# Patient Record
(demographics unavailable — no encounter records)

---

## 2018-03-23 NOTE — ER
Nurse's Notes                                                                                     

 [facility]                                                                

Name: Alexa Parish                                                                              

Age: 27 yrs                                                                                       

Sex: Female                                                                                       

: 1991                                                                                   

MRN: [id number]                                                                                   

Arrival Date: 2018                                                                          

Time: 14:44                                                                                       

Account#: [id number]                                                                            

Bed 23                                                                                            

Private MD:                                                                                       

Diagnosis: Nausea with vomiting, unspecified;Weakness                                             

                                                                                                  

Presentation:                                                                                     

                                                                                             

14:55 Presenting complaint: Patient states: headache, nausea, and fatigue for one week.       la1 

      Transition of care: patient was not received from another setting of care. Onset of         

      symptoms was 2018. Care prior to arrival: None.                                   

14:55 Method Of Arrival: Ambulatory                                                           la1 

14:55 Acuity: PRANEETH 3                                                                           la1 

                                                                                                  

OB/GYN:                                                                                           

15:17 LMP 3/23/2018                                                                           tw2 

                                                                                                  

Historical:                                                                                       

- Allergies:                                                                                      

14:55 Sulfa (Sulfonamide Antibiotics);                                                        la1 

- PMHx:                                                                                           

14:55 None;                                                                                   la1 

                                                                                                  

- Immunization history:: Adult Immunizations up to date.                                          

- Social history:: Smoking status: Patient/guardian denies using tobacco.                         

                                                                                                  

                                                                                                  

Screenin:58 Abuse screen: Denies threats or abuse. Nutritional screening: No deficits noted.        tw2 

      Tuberculosis screening: No symptoms or risk factors identified. Fall Risk None              

      identified.                                                                                 

                                                                                                  

Assessment:                                                                                       

15:17 General: Appears in no apparent distress. obese, well groomed, Behavior is calm,        tw2 

      cooperative, appropriate for age. Pain: Complains of pain in abdomen. Neuro: Level of       

      Consciousness is awake, alert, obeys commands, Oriented to person, place, time,             

      situation. Cardiovascular: Denies chest pain, shortness of breath, Heart tones S1 S2        

      Capillary refill < 3 seconds Patient's skin is warm and dry. Respiratory: Airway is         

      patent Respiratory effort is even, unlabored, Respiratory pattern is regular,               

      symmetrical, Breath sounds are clear bilaterally. GI: Abdomen is round obese, Bowel         

      sounds Reports diarrhea, nausea. : No signs and/or symptoms were reported regarding       

      the genitourinary system. EENT: No signs and/or symptoms were reported regarding the        

      EENT system. Derm: No signs and/or symptoms reported regarding the dermatologic system.     

      Skin is intact, is healthy with good turgor, Skin temperature is warm. Musculoskeletal:     

      Range of motion:.                                                                           

15:44 Reassessment: Patient appears in no apparent distress at this time. No changes from     tw2 

      previously documented assessment. Patient and/or family updated on plan of care and         

      expected duration. Pain level reassessed. Patient is alert, oriented x 3, equal             

      unlabored respirations, skin warm/dry/pink.                                                 

16:22 Reassessment: Patient appears in no apparent distress at this time. No changes from     tw2 

      previously documented assessment. Patient and/or family updated on plan of care and         

      expected duration. Pain level reassessed. Patient is alert, oriented x 3, equal             

      unlabored respirations, skin warm/dry/pink. Patient states feeling better.                  

16:43 Reassessment: Patient appears in no apparent distress at this time. No changes from     tw2 

      previously documented assessment. Patient and/or family updated on plan of care and         

      expected duration. Pain level reassessed. Patient is alert, oriented x 3, equal             

      unlabored respirations, skin warm/dry/pink.                                                 

                                                                                                  

Vital Signs:                                                                                      

14:55  / 92; Pulse 95; Resp 16; Temp 98.5(TE); Pulse Ox 99% on R/A; Weight 136.08 kg;   la1 

      Height 5 ft. 7 in. (170.18 cm);                                                             

15:45  / 73; Pulse 79; Resp 18; Pulse Ox 96% on R/A;                                    tw2 

16:22  / 81; Pulse 78; Resp 17; Pulse Ox 100% on R/A;                                   tw2 

14:55 Body Mass Index 46.99 (136.08 kg, 170.18 cm)                                            la1 

                                                                                                  

ED Course:                                                                                        

14:44 Patient arrived in ED.                                                                  as  

14:52 [facility]                                                        kb  

14:52 Rei Zuluaga MD is Attending Physician.                                                kb  

14:55 Triage completed.                                                                       la1 

14:56 Arm band placed on left wrist.                                                          la1 

14:57 Nicole Atkinson, RN is Primary Nurse.                                                        tw2 

14:58 Bed in low position. Call light in reach. Pulse ox on. NIBP on.                         tw2 

14:58 No provider procedures requiring assistance completed.                                  tw2 

15:10 Inserted saline lock: 22 gauge in right antecubital area, using aseptic technique.      tw2 

      Blood collected.                                                                            

15:36 Flu Sent.                                                                               tw2 

16:19 Awaiting: awaiting completion of iv fluids prior to discharge.                          tw2 

16:44 IV discontinued, intact, bleeding controlled, No redness/swelling at site. Pressure     tw2 

      dressing applied.                                                                           

                                                                                                  

Administered Medications:                                                                         

15:16 Drug: NS 0.9% 1000 ml Route: IV; Rate: 1000 ml; Site: right antecubital;                tw2 

16:44 Follow up: Response: No adverse reaction; IV Status: Completed infusion; IV Intake:     tw2 

      1000ml                                                                                      

15:16 Drug: Zofran 4 mg Route: IVP; Site: right antecubital;                                  tw2 

15:45 Follow up: Response: No adverse reaction; Nausea is decreased                           tw2 

                                                                                                  

                                                                                                  

Intake:                                                                                           

16:44 IV: 1000ml; Total: 1000ml.                                                              tw2 

                                                                                                  

Outcome:                                                                                          

16:12 Discharge ordered by MD.                                                                ron  

16:44 Discharged to home ambulatory, with significant other.                                  tw2 

16:44 Condition: stable                                                                           

16:44 Discharge instructions given to patient, significant other, Instructed on discharge         

      instructions, follow up and referral plans. medication usage, Demonstrated                  

      understanding of instructions, follow-up care, medications, Prescriptions given X 1.        

16:45 Patient left the ED.                                                                    tw2 

                                                                                                  

Signatures:                                                                                       

Trinidad Alberto, MALCOLMP-C                 DIMITRI-Aditi Price Lee, RN                         RN   la1                                                  

Nicole Atkinson RN                          RN   tw2                                                  

                                                                                                  

**************************************************************************************************

## 2018-03-23 NOTE — EDPHYS
Physician Documentation                                                                           

 [facility]                                                                

Name: Alexa Parish                                                                              

Age: 27 yrs                                                                                       

Sex: Female                                                                                       

: 1991                                                                                   

MRN: [id number]                                                                                   

Arrival Date: 2018                                                                          

Time: 14:44                                                                                       

Account#: [id number]                                                                            

Bed 23                                                                                            

Private MD:                                                                                       

ED Physician Rei Zuluaga                                                                         

HPI:                                                                                              

                                                                                             

15:21 This 27 yrs old  Female presents to ER via Ambulatory with complaints of       kb  

      Nausea, Diarrhea, Weakness.                                                                 

15:21 The patient presents to the emergency department with nausea, that is moderate. Onset:  kb  

      The symptoms/episode began/occurred last week. Possible causes: unknown. The symptoms       

      are aggravated by nothing. The symptoms are alleviated by nothing. Associated signs and     

      symptoms: Pertinent positives: nausea. Severity of symptoms: At their worst the             

      symptoms were moderate in the emergency department the symptoms are unchanged. The          

      patient has not experienced similar symptoms in the past. The patient has not recently      

      seen a physician. Pt c/o nausea, weakness and fatigue for a week. States she was around     

      someone with the flu so she came to get tested.                                             

                                                                                                  

OB/GYN:                                                                                           

15:17 LMP 3/23/2018                                                                           tw2 

                                                                                                  

Historical:                                                                                       

- Allergies:                                                                                      

14:55 Sulfa (Sulfonamide Antibiotics);                                                        la1 

- PMHx:                                                                                           

14:55 None;                                                                                   la1 

                                                                                                  

- Immunization history:: Adult Immunizations up to date.                                          

- Social history:: Smoking status: Patient/guardian denies using tobacco.                         

                                                                                                  

                                                                                                  

ROS:                                                                                              

15:21 Constitutional: Negative for fever, chills, and weight loss, ENT: Negative for injury,  kb  

      pain, and discharge, Neck: Negative for injury, pain, and swelling, Cardiovascular:         

      Negative for chest pain, palpitations, and edema, Respiratory: Negative for shortness       

      of breath, cough, wheezing, and pleuritic chest pain, Back: Negative for injury and         

      pain, : Negative for injury, bleeding, discharge, and swelling, MS/Extremity:             

      Negative for injury and deformity, Skin: Negative for injury, rash, and discoloration.      

15:21 Abdomen/GI: Positive for nausea, Negative for abdominal pain, diarrhea, constipation,       

      abdominal cramps, abdominal distension, anorexia.                                           

15:21 Neuro: Positive for weakness.                                                               

                                                                                                  

Exam:                                                                                             

15:21 Constitutional:  This is a well developed, well nourished patient who is awake, alert,  kb  

      and in no acute distress. Head/Face:  Normocephalic, atraumatic. Chest/axilla:  Normal      

      chest wall appearance and motion.  Nontender with no deformity.  No lesions are             

      appreciated. Cardiovascular:  Regular rate and rhythm with a normal S1 and S2.  No          

      gallops, murmurs, or rubs.  Normal PMI, no JVD.  No pulse deficits. Respiratory:  Lungs     

      have equal breath sounds bilaterally, clear to auscultation and percussion.  No rales,      

      rhonchi or wheezes noted.  No increased work of breathing, no retractions or nasal          

      flaring. Abdomen/GI:  Soft, non-tender, with normal bowel sounds.  No distension or         

      tympany.  No guarding or rebound.  No evidence of tenderness throughout. Back:  No          

      spinal tenderness.  No costovertebral tenderness.  Full range of motion. Skin:  Warm,       

      dry with normal turgor.  Normal color with no rashes, no lesions, and no evidence of        

      cellulitis. MS/ Extremity:  Pulses equal, no cyanosis.  Neurovascular intact.  Full,        

      normal range of motion. Neuro:  Awake and alert, GCS 15, oriented to person, place,         

      time, and situation.  Cranial nerves II-XII grossly intact.  Motor strength 5/5 in all      

      extremities.  Sensory grossly intact.  Cerebellar exam normal.  Normal gait.                

                                                                                                  

Vital Signs:                                                                                      

14:55  / 92; Pulse 95; Resp 16; Temp 98.5(TE); Pulse Ox 99% on R/A; Weight 136.08 kg;   la1 

      Height 5 ft. 7 in. (170.18 cm);                                                             

15:45  / 73; Pulse 79; Resp 18; Pulse Ox 96% on R/A;                                    tw2 

16:22  / 81; Pulse 78; Resp 17; Pulse Ox 100% on R/A;                                   tw2 

14:55 Body Mass Index 46.99 (136.08 kg, 170.18 cm)                                            la1 

                                                                                                  

MDM:                                                                                              

14:59 Patient medically screened.                                                             kb  

15:20 Data reviewed: vital signs, nurses notes. Data interpreted: Pulse oximetry: on room air kb  

      is 99 %. Interpretation: normal.                                                            

16:09 Counseling: I had a detailed discussion with the patient and/or guardian regarding: the kb  

      historical points, exam findings, and any diagnostic results supporting the                 

      discharge/admit diagnosis, lab results, the need for outpatient follow up, a family         

      practitioner, to return to the emergency department if symptoms worsen or persist or if     

      there are any questions or concerns that arise at home.                                     

                                                                                                  

03/23                                                                                             

15:04 Order name: CBC with Diff                                                               kb  

                                                                                             

15:04 Order name: Basic Metabolic Panel                                                       kb  

                                                                                             

15:04 Order name: Mono Screen Profile                                                         kb  

                                                                                             

15:25 Order name: Flu                                                                         kb  

                                                                                             

15:29 Order name: CBC with Automated Diff; Complete Time: 15:30                               EDMS

                                                                                             

15:30 Order name: Basic Metabolic Panel; Complete Time: 15:30                                 EDMS

                                                                                             

15:04 Order name: IV Start; Complete Time: 15:10                                              kb  

                                                                                             

15:46 Order name: Mono Screen; Complete Time: 15:47                                           EDMS

                                                                                             

16:06 Order name: Influenza Screen (A ; Complete Time: 16:08                                  EDMS

                                                                                                  

Administered Medications:                                                                         

15:16 Drug: NS 0.9% 1000 ml Route: IV; Rate: 1000 ml; Site: right antecubital;                tw2 

16:44 Follow up: Response: No adverse reaction; IV Status: Completed infusion; IV Intake:     tw2 

      1000ml                                                                                      

15:16 Drug: Zofran 4 mg Route: IVP; Site: right antecubital;                                  tw2 

15:45 Follow up: Response: No adverse reaction; Nausea is decreased                           tw2 

                                                                                                  

                                                                                                  

Disposition:                                                                                      

17:54 Co-signature as Attending Physician, Rei Zuluaga MD.                                    rn  

                                                                                                  

Disposition:                                                                                      

18 16:12 Discharged to Home. Impression: Nausea with vomiting, unspecified, Weakness.       

- Condition is Stable.                                                                            

- Discharge Instructions: Nausea and Vomiting, Easy-to-Read, Weakness, Easy-to-Read.              

- Prescriptions for Zofran 4 mg Oral Tablet - take 1 tablet by ORAL route every 6 hours           

  As needed; 20 tablet.                                                                           

- Medication Reconciliation Form, Thank You Letter, Antibiotic Education, Prescription            

  Opioid Use, Work release form form.                                                             

- Follow up: Emergency Department; When: As needed; Reason: Worsening of condition.               

  Follow up: Private Physician; When: 2 - 3 days; Reason: Recheck today's complaints,             

  Continuance of care, Re-evaluation by your physician.                                           

                                                                                                  

                                                                                                  

                                                                                                  

Signatures:                                                                                       

Dispatcher [employer]                           EDTrinidad Alcazar, MALCOLMP-C                 MALCOLMP-Rei Hill MD MD rn Attema, Lee, RN RN   la1                                                  

Nicole Atkinson RN RN   tw2                                                  

                                                                                                  

**************************************************************************************************

## 2018-06-02 NOTE — ER
Nurse's Notes                                                                                     

 [facility]                                                                

Name: Alexa Parish                                                                              

Age: 27 yrs                                                                                       

Sex: Female                                                                                       

: 1991                                                                                   

MRN: [id number]                                                                                   

Arrival Date: 2018                                                                          

Time: 13:51                                                                                       

Account#: [id number]                                                                            

Bed 23                                                                                            

Private MD: Out, [facility]                                                                    

Diagnosis: Low back pain                                                                          

                                                                                                  

Presentation:                                                                                     

                                                                                             

13:52 Presenting complaint: Patient states: "I went to [facility] ([facility] on Wednesday and they    sv  

      said I have crystals in my urine but they didn't do any imaging and sent me home with       

      nausea medicine and ibuprofen but it hasn't been doing anything for me." c/o n/v.           

      Transition of care: patient was not received from another setting of care. Onset of         

      symptoms was May 30, 2018. Risk Assessment: Do you want to hurt yourself or someone         

      else? Patient reports no desire to harm self or others. Initial Sepsis Screen: Does the     

      patient meet any 2 criteria? No. Patient's initial sepsis screen is negative. Does the      

      patient have a suspected source of infection? No. Patient's initial sepsis screen is        

      negative. Care prior to arrival: None.                                                      

13:52 Method Of Arrival: Ambulatory                                                           sv  

13:52 Acuity: PRANEETH 3                                                                           sv  

                                                                                                  

OB/GYN:                                                                                           

13:55 LMP 2018                                                                           sv  

                                                                                                  

Historical:                                                                                       

- Allergies:                                                                                      

13:54 Sulfa (Sulfonamide Antibiotics);                                                        sv  

- Home Meds:                                                                                      

13:54 Trileptal oral oral [Active]; Abilify oral oral [Active]; Buspirone Oral [Active];      sv  

      Zoloft Oral [Active];                                                                       

- PMHx:                                                                                           

13:54 Bipolar disorder; Anxiety;                                                              sv  

- PSHx:                                                                                           

13:54 None;                                                                                   sv  

                                                                                                  

- Immunization history:: Adult Immunizations up to date.                                          

- Social history:: Smoking status: Patient/guardian denies using tobacco.                         

- Ebola Screening: : No symptoms or risks identified at this time.                                

                                                                                                  

                                                                                                  

Screenin:19 Abuse screen: Denies threats or abuse. Nutritional screening: No deficits noted.        tl3 

      Tuberculosis screening: No symptoms or risk factors identified. Fall Risk None              

      identified.                                                                                 

                                                                                                  

Assessment:                                                                                       

14:19 General: Appears in no apparent distress. comfortable, obese, well groomed, well        tl3 

      developed, well nourished. Pain: Complains of pain in right upper quadrant, right           

      flank. Neuro: No deficits noted. Level of Consciousness is awake, alert, obeys              

      commands, Oriented to person, place, time, situation, Appropriate for age.                  

      Cardiovascular: No deficits noted. Heart tones S1 S2 present Patient's skin is warm and     

      dry. Respiratory: Airway is patent Respiratory effort is even, unlabored, Respiratory       

      pattern is regular, symmetrical, Breath sounds are clear bilaterally. GI: Bowel sounds      

      present X 4 quads. Abd is soft and non tender. : Urine is clear, Reports pain dx with     

      kidney stones on Thursday. EENT: No deficits noted. No signs and/or symptoms were           

      reported regarding the EENT system. Derm: No deficits noted. No signs and/or symptoms       

      reported regarding the dermatologic system. Musculoskeletal: No deficits noted.             

15:16 Reassessment: Patient appears in no apparent distress at this time. No changes from     tl3 

      previously documented assessment. Patient and/or family updated on plan of care and         

      expected duration. Pain level reassessed. Patient is alert, oriented x 3, equal             

      unlabored respirations, skin warm/dry/pink.                                                 

                                                                                                  

Vital Signs:                                                                                      

13:55  / 87; Pulse 92; Resp 18; Temp 98.4; Pulse Ox 99% ; Weight 131.54 kg; Height 5    sv  

      ft. 8 in. (172.72 cm); Pain 6/10;                                                           

15:16  / 71; Pulse 81; Resp 18; Pulse Ox 100% ;                                         tl3 

13:55 Body Mass Index 44.09 (131.54 kg, 172.72 cm)                                            sv  

                                                                                                  

ED Course:                                                                                        

13:51 Patient arrived in ED.                                                                  sb2 

13:52 Out, [facility] is Private Physician.                                                      sb2 

13:54 Triage completed.                                                                       sv  

13:57 Joseph Jackson PA is PHCP.                                                              [facility] 

13:57 Gregg Lara MD is Attending Physician.                                              jmm 

13:58 Arm band placed on right wrist. Patient placed in an exam room, on a stretcher.         sv  

14:19 Marcela Davis, RN is Primary Nurse.                                                     tl3 

14:19 Patient has correct armband on for positive identification. Bed in low position. Call   tl3 

      light in reach. Side rails up X 1. Pulse ox on. NIBP on.                                    

14:19 No provider procedures requiring assistance completed. Initial lab(s) drawn, by me,     tl3 

      sent to lab. Urine collected: clean catch specimen, clear. Inserted saline lock: 20         

      gauge in left forearm, using aseptic technique. Blood collected.                            

15:23 CT Stone Protocol In Process Unspecified.                                               EDMS

15:23 CT completed. Patient moved to CT via wheelchair. Patient taken to ultrasound.          cw1 

15:29 Ultrasound completed. Patient tolerated well.                                           sg3 

15:43 US Abdomen Limited In Process Unspecified.                                              EDMS

                                                                                                  

Administered Medications:                                                                         

14:29 Drug: Zofran 4 mg Route: IVP; Infused Over: 2 mins; Site: left forearm;                 tl3 

15:17 Follow up: Response: No adverse reaction                                                tl3 

15:16 Drug: Ketorolac 30 mg Route: IVP; Infused Over: 2 mins; Site: left forearm;             tl3 

                                                                                                  

                                                                                                  

Outcome:                                                                                          

17:19 Discharge ordered by MD.                                                                blanca 

18:02 Patient left the ED.                                                                    tl3 

                                                                                                  

Signatures:                                                                                       

Dispatcher [employer]                           Tamera Hercules, RN                    RN   Joseph Villanueva PA PA jmm Woodley, Crystal                             cw1                                                  

Corry Alas                               sg3                                                  

Aleisha Campbell                               sb2                                                  

Marcela Davis, BOBO                       RN   tl3                                                  

                                                                                                  

**************************************************************************************************

## 2018-06-02 NOTE — RAD REPORT
EXAM DESCRIPTION:  CT - Stone Protocol - 6/2/2018 3:23 pm

 

CLINICAL HISTORY:  Abdominal pain, right flank pain

 

COMPARISON:  None.

 

TECHNIQUE:  Axial 5 mm thick images were obtained without oral or IV contrast. The field-of-view span
s the entirety of the  system including uppermost abdomen and lung bases.

 

All CT scans are performed using dose optimization technique as appropriate and may include automated
 exposure control or mA/KV adjustment according to patient size.

 

FINDINGS:  No hydronephrosis is present and no obstructing ureteral calculi. No suspicious renal mass
es. Isodense masses and pyelonephritis are not excluded on a stone protocol CT scan. No urinary bladd
er suspicious finding.  Multiple phleboliths are present. Uterus and ovaries are normal in appearance
.

 

Imaged portions of the liver, spleen and pancreas show no suspicious findings on non-contrast imaging
. No gallbladder or biliary tree abnormality identified. No significant adrenal finding.

 

No suspicious bowel findings.

 

No hernia, mass or bulky lymphadenopathy noted. No free air, free fluid or inflammatory stranding.

 

No significant bony abnormality.

 

IMPRESSION:  No hydronephrosis, obstructing calculus or acute  finding.

 

Isodense masses and pyelonephritis are not excluded on stone protocol technique.

 

Remainder the exam also without significant or suspicious finding.

## 2018-06-02 NOTE — RAD REPORT
EXAM DESCRIPTION:  US - Abdomen Exam Limited - 6/2/2018 3:43 pm

 

CLINICAL HISTORY:  Abdominal pain

 

COMPARISON:  CT study June 2nd

 

FINDINGS:  No gallstones, sludge or other abnormalities within the gallbladder lumen. There is no wal
l thickening or pericholecystic fluid.

 

No common duct stone or biliary tree dilatation identified.

 

IMPRESSION:  Normal gallbladder and biliary tree ultrasound.

## 2018-06-02 NOTE — EDPHYS
Physician Documentation                                                                           

 [facility]                                                                

Name: Alexa Parish                                                                              

Age: 27 yrs                                                                                       

Sex: Female                                                                                       

: 1991                                                                                   

MRN: [id number]                                                                                   

Arrival Date: 2018                                                                          

Time: 13:51                                                                                       

Account#: [id number]                                                                            

Bed 23                                                                                            

Private MD: Out, [facility]                                                                    

ED Physician Gregg Lara                                                                       

HPI:                                                                                              

                                                                                             

14:11 This 27 yrs old  Female presents to ER via Ambulatory with complaints of       jmm 

      Possible Kidney Stone.                                                                      

14:11 Associated signs and symptoms: Pertinent positives: abdominal pain, vomiting, Pertinent jmm 

      negatives: fever. . This is a 27 year old female with a history of bipolar, anxiety         

      that presents to the ED with right flank pain, vomiting beginning approximately 2 days      

      ago. Denies diarrhea, denies fever.                                                         

                                                                                                  

OB/GYN:                                                                                           

13:55 LMP 2018                                                                           sv  

                                                                                                  

Historical:                                                                                       

- Allergies:                                                                                      

13:54 Sulfa (Sulfonamide Antibiotics);                                                        sv  

- Home Meds:                                                                                      

13:54 Trileptal oral oral [Active]; Abilify oral oral [Active]; Buspirone Oral [Active];      sv  

      Zoloft Oral [Active];                                                                       

- PMHx:                                                                                           

13:54 Bipolar disorder; Anxiety;                                                              sv  

- PSHx:                                                                                           

13:54 None;                                                                                   sv  

                                                                                                  

- Immunization history:: Adult Immunizations up to date.                                          

- Social history:: Smoking status: Patient/guardian denies using tobacco.                         

- Ebola Screening: : No symptoms or risks identified at this time.                                

                                                                                                  

                                                                                                  

ROS:                                                                                              

14:11 Constitutional: Negative for fever, chills, and weight loss, Cardiovascular: Negative   jmm 

      for chest pain, palpitations, and edema, Respiratory: Negative for shortness of breath,     

      cough, wheezing, and pleuritic chest pain.                                                  

14:11 Abdomen/GI: Positive for abdominal pain, nausea and vomiting.                               

14:11 Back: Positive for flank pain.                                                              

14:11 All other systems are negative.                                                             

                                                                                                  

Exam:                                                                                             

14:11 Cardiovascular:  Regular rate and rhythm.  No gallops, murmurs, or rubs. Full/Equal     m 

      distal pulses. Respiratory:  Lungs have equal breath sounds bilaterally, clear to           

      auscultation.  No rales, rhonchi or wheezes noted.  No increased work of breathing, no      

      retractions or nasal flaring.                                                               

14:11 Constitutional: The patient appears in no acute distress, alert, awake.                     

14:11 Abdomen/GI: Inspection: abdomen appears normal, Bowel sounds: normal, Palpation: soft,      

      mild abdominal tenderness, in the right upper quadrant.                                     

14:11 Musculoskeletal/extremity: ROM: intact in all extremities.                                  

14:11 Neuro: Orientation: is normal, Mentation: is normal, Memory: is normal, Gait: is steady.    

14:11 Back: pain is elicited on palpation of right CVA and right lower lumbar paraspinal      [facility] 

      region.                                                                                     

                                                                                                  

Vital Signs:                                                                                      

13:55  / 87; Pulse 92; Resp 18; Temp 98.4; Pulse Ox 99% ; Weight 131.54 kg; Height 5    sv  

      ft. 8 in. (172.72 cm); Pain 6/10;                                                           

15:16  / 71; Pulse 81; Resp 18; Pulse Ox 100% ;                                         tl3 

13:55 Body Mass Index 44.09 (131.54 kg, 172.72 cm)                                            sv  

                                                                                                  

MDM:                                                                                              

14:05 Patient medically screened.                                                             [facility] 

14:11 Data reviewed: vital signs, nurses notes, lab test result(s), radiologic studies, CT    [facility] 

      scan, ultrasound. ED course: Patient states feeling much better after administration of     

      ketorlac. Imaging studies reveal no acute intraabdominal process. patient is advised to     

      return to the ED if she develops increased pain, vomiting, fever, ect. Patient              

      understood and agree with the plan of care. .                                               

                                                                                                  

                                                                                             

14:11 Order name: Amylase, Serum; Complete Time: 14:54                                        [facility] 

                                                                                             

14:11 Order name: Basic Metabolic Panel; Complete Time: 14:54                                 [facility] 

                                                                                             

14:11 Order name: CBC with Diff; Complete Time: 14:54                                         [facility] 

                                                                                             

14:11 Order name: Creatinine for Radiology; Complete Time: 14:54                              [facility] 

                                                                                             

14:11 Order name: Hepatic Function; Complete Time: 14:54                                      [facility] 

                                                                                             

14:11 Order name: Lipase; Complete Time: 14:54                                                [facility] 

                                                                                             

14:11 Order name: Urine Pregnancy Test (obtain specimen); Complete Time: 14:23                [facility] 

                                                                                             

14:11 Order name: Urine Microscopic Only; Complete Time: 16:04                                [facility] 

                                                                                             

14:23 Order name: Urine Dipstick--Ancillary (enter results); Complete Time: 14:54             [facility] 

                                                                                             

14:23 Order name: Urine Pregnancy--Ancillary (enter results); Complete Time: 14:54            [facility] 

                                                                                             

14:54 Order name: CT Stone Protocol; Complete Time: 16:04                                     [facility] 

                                                                                             

14:55 Order name: US Abdomen Limited; Complete Time: 16:57                                    [facility] 

                                                                                             

14:11 Order name: IV Saline Lock; Complete Time: 14:23                                        [facility] 

                                                                                             

14:11 Order name: Labs collected and sent; Complete Time: 14:24                               [facility] 

                                                                                             

14:11 Order name: Urine Dipstick-Ancillary (obtain specimen); Complete Time: 14:24            [facility] 

                                                                                                  

Administered Medications:                                                                         

14:29 Drug: Zofran 4 mg Route: IVP; Infused Over: 2 mins; Site: left forearm;                 tl3 

15:17 Follow up: Response: No adverse reaction                                                tl3 

15:16 Drug: Ketorolac 30 mg Route: IVP; Infused Over: 2 mins; Site: left forearm;             tl3 

                                                                                                  

                                                                                                  

Disposition:                                                                                      

                                                                                             

12:36 Co-signature as Attending Physician, Gregg Lara MD.                                    

                                                                                                  

Disposition:                                                                                      

18 17:19 Discharged to Home. Impression: Low back pain.                                     

- Condition is Stable.                                                                            

- Discharge Instructions: Back Pain, Adult.                                                       

- Prescriptions for Ibuprofen 600 mg Oral Tablet - take 1 tablet by ORAL route every 6            

  hours As needed take with food; 30 tablet.                                                      

- Medication Reconciliation Form, Thank You Letter, Antibiotic Education, Prescription            

  Opioid Use form.                                                                                

- Follow up: Private Physician; When: As needed; Reason: Re-evaluation by your                    

  physician.                                                                                      

- Problem is new.                                                                                 

- Symptoms have improved.                                                                         

- Notes: Please follow up with your primary care provider in 1 to 2 days for                      

  reevaluation. Please return to the ED if you develop increased pain, fever, chest               

  pain, shortness of breath, or any other concerning symptoms.                                    

                                                                                                  

                                                                                                  

Signatures:                                                                                       

Dispatcher [employer]                           EDMS                                                 

Tamera Santana, RN                    RN   Joseph Villanueva PA PA   Gregg Nicole MD MD                                                      

Marcela Davis RN                       RN   tl3                                                  

                                                                                                  

Corrections: (The following items were deleted from the chart)                                    

                                                                                             

18:02 17:19 2018 17:19 Discharged to Home. Impression: Low back pain. Condition is      tl3 

      Stable. Forms are Medication Reconciliation Form, Thank You Letter, Antibiotic              

      Education, Prescription Opioid Use. Follow up: Private Physician; When: As needed;          

      Reason: Re-evaluation by your physician. Problem is new. Symptoms have improved. blanca        

                                                                                                  

**************************************************************************************************

## 2019-09-01 NOTE — EDPHYS
Physician Documentation                                                                           

 [facility]                                                                 

Name: Alexa Parish                                                                              

Age: 28 yrs                                                                                       

Sex: Female                                                                                       

: 1991                                                                                   

MRN: [id number]                                                                                   

Arrival Date: 2019                                                                          

Time: 13:08                                                                                       

Account#: [id number]                                                                            

Bed 27                                                                                            

Private MD:                                                                                       

ED Physician Vinicio Marsh                                                                      

HPI:                                                                                              

                                                                                             

13:22 This 28 yrs old  Female presents to ER via Ambulatory with complaints of       kb  

      Nausea, Diarrhea.                                                                           

13:22 The patient presents to the emergency department with nausea, diarrhea. Onset: The      kb  

      symptoms/episode began/occurred 3 day(s) ago. Possible causes: unknown. The symptoms        

      are aggravated by nothing. The symptoms are alleviated by nothing. Associated signs and     

      symptoms: Pertinent positives: nausea, vomiting. Severity of symptoms: At their worst       

      the symptoms were moderate in the emergency department the symptoms are unchanged. The      

      patient has not experienced similar symptoms in the past. The patient has not recently      

      seen a physician. Pt reports fatigue, weakness, dizziness and nausea for a couple of        

      days, diarrhea started today.                                                               

                                                                                                  

OB/GYN:                                                                                           

13:09 LMP 8/15/2019                                                                           la1 

                                                                                                  

Historical:                                                                                       

- Allergies:                                                                                      

13:09 Sulfa (Sulfonamide Antibiotics);                                                        la1 

- PMHx:                                                                                           

13:09 Anxiety; Bipolar disorder;                                                              la1 

                                                                                                  

- Immunization history:: Adult Immunizations up to date.                                          

- Social history:: Smoking status: Patient/guardian denies using tobacco.                         

- Ebola Screening: : No symptoms or risks identified at this time.                                

                                                                                                  

                                                                                                  

ROS:                                                                                              

13:14 ENT: Negative for injury, pain, and discharge, Neck: Negative for injury, pain, and     kb  

      swelling, Cardiovascular: Negative for chest pain, palpitations, and edema,                 

      Respiratory: Negative for shortness of breath, cough, wheezing, and pleuritic chest         

      pain, Back: Negative for injury and pain, : Negative for injury, bleeding, discharge,     

      and swelling, MS/Extremity: Negative for injury and deformity, Skin: Negative for           

      injury, rash, and discoloration, Neuro: Negative for headache, weakness, numbness,          

      tingling, and seizure.                                                                      

13:14 Abdomen/GI: Positive for nausea, diarrhea, Negative for abdominal pain.                     

13:19 Constitutional: Positive for fatigue, malaise.                                          kb  

                                                                                                  

Exam:                                                                                             

13:20 Constitutional:  This is a well developed, well nourished patient who is awake, alert,  kb  

      and in no acute distress. Head/Face:  Normocephalic, atraumatic. ENT:  Nares patent. No     

      nasal discharge, no septal abnormalities noted.  Tympanic membranes are normal and          

      external auditory canals are clear.  Oropharynx with no redness, swelling, or masses,       

      exudates, or evidence of obstruction, uvula midline.  Mucous membranes moist. Neck:         

      Trachea midline, no thyromegaly or masses palpated, and no cervical lymphadenopathy.        

      Supple, full range of motion without nuchal rigidity, or vertebral point tenderness.        

      No Meningismus. Chest/axilla:  Normal chest wall appearance and motion.  Nontender with     

      no deformity.  No lesions are appreciated. Cardiovascular:  Regular rate and rhythm         

      with a normal S1 and S2.  No gallops, murmurs, or rubs.  Normal PMI, no JVD.  No pulse      

      deficits. Respiratory:  Lungs have equal breath sounds bilaterally, clear to                

      auscultation and percussion.  No rales, rhonchi or wheezes noted.  No increased work of     

      breathing, no retractions or nasal flaring. Abdomen/GI:  Soft, non-tender, with normal      

      bowel sounds.  No distension or tympany.  No guarding or rebound.  No evidence of           

      tenderness throughout. Skin:  Warm, dry with normal turgor.  Normal color with no           

      rashes, no lesions, and no evidence of cellulitis. MS/ Extremity:  Pulses equal, no         

      cyanosis.  Neurovascular intact.  Full, normal range of motion. Neuro:  Awake and           

      alert, GCS 15, oriented to person, place, time, and situation.  Cranial nerves II-XII       

      grossly intact.  Motor strength 5/5 in all extremities.  Sensory grossly intact.            

      Cerebellar exam normal.  Normal gait.                                                       

                                                                                                  

Vital Signs:                                                                                      

13:09  / 78; Pulse 88; Resp 16; Temp 98.4; Pulse Ox 100% on R/A; Weight 140.61 kg;      la1 

      Height 5 ft. 8 in. (172.72 cm);                                                             

13:41  / 67 Supine; Pulse 78;                                                           lt1 

13:41  / 80 Sitting; Pulse 84;                                                          lt1 

13:41  / 86 Standing; Pulse 98;                                                         lt1 

14:08  / 71; Pulse 85; Resp 17 S; Pulse Ox 100% on R/A;                                 ca1 

15:04  / 72; Pulse 79; Resp 14; Temp 98.3(O); Pulse Ox 100% on R/A;                     ca1 

13:09 Body Mass Index 47.13 (140.61 kg, 172.72 cm)                                            la1 

                                                                                                  

MDM:                                                                                              

13:10 Patient medically screened.                                                             kb  

13:14 Data reviewed: vital signs, nurses notes. Data interpreted: Pulse oximetry: on room air kb  

      is 100 %. Interpretation: normal.                                                           

14:55 Counseling: I had a detailed discussion with the patient and/or guardian regarding: the kb  

      historical points, exam findings, and any diagnostic results supporting the                 

      discharge/admit diagnosis, lab results, the need for outpatient follow up, a family         

      practitioner, to return to the emergency department if symptoms worsen or persist or if     

      there are any questions or concerns that arise at home.                                     

                                                                                                  

                                                                                             

13:20 Order name: Basic Metabolic Panel; Complete Time: 14:07                                 kb  

                                                                                             

13:20 Order name: CBC with Diff; Complete Time: 14:07                                         kb  

                                                                                             

13:20 Order name: Mono Screen Profile; Complete Time: 14:11                                   kb  

                                                                                             

13:29 Order name: Urine Dipstick--Ancillary (enter results); Complete Time: 13:41             eb  

                                                                                             

13:29 Order name: Urine Pregnancy--Ancillary (enter results); Complete Time: 13:41            eb  

                                                                                             

13:20 Order name: IV Saline Lock; Complete Time: 13:39                                        kb  

                                                                                             

13:20 Order name: Labs collected and sent; Complete Time: 13:39                               kb  

                                                                                             

13:20 Order name: Urine Dipstick-Ancillary (obtain specimen); Complete Time: 13:26            kb  

                                                                                             

13:20 Order name: Orthostatics; Complete Time: 13:39                                          kb  

                                                                                                  

Administered Medications:                                                                         

14:17 Drug: NS 0.9% 1000 ml Route: IV; Rate: 1000 ml; Site: right antecubital;                ca1 

                                                                                                  

                                                                                                  

Disposition:                                                                                      

                                                                                             

09:21 Co-signature as Attending Physician, Vinicio Marsh MD I agree with the assessment and  ulises 

      plan of care.                                                                               

                                                                                                  

Disposition:                                                                                      

19 14:55 Discharged to Home. Impression: Diarrhea, unspecified.                             

- Condition is Stable.                                                                            

- Discharge Instructions: Food Choices to Help Relieve Diarrhea, Adult, Diarrhea,                 

  Adult, Easy-to-Read.                                                                            

                                                                                                  

- Medication Reconciliation Form, Thank You Letter, Antibiotic Education, Prescription            

  Opioid Use form.                                                                                

- Follow up: Private Physician; When: 2 - 3 days; Reason: Recheck today's complaints,             

  Continuance of care, Re-evaluation by your physician. Follow up: Emergency                      

  Department; When: As needed; Reason: Worsening of condition.                                    

                                                                                                  

                                                                                                  

                                                                                                  

Signatures:                                                                                       

Dispatcher [employer]                           EDMS                                                 

Trinidad Alberto, FNP-C                 MALCOLMP-Vinicio Alford MD MD cha Attema, Lee, RN                         RN   la1                                                  

AcHodan rodriguez RN                        RN   ca1                                                  

                                                                                                  

Corrections: (The following items were deleted from the chart)                                    

                                                                                             

13:20 13:14 Constitutional: Negative for fever, chills, and weight loss, ENT: Negative for    kb  

      injury, pain, and discharge, Neck: Negative for injury, pain, and swelling,                 

      Cardiovascular: Negative for chest pain, palpitations, and edema, Respiratory: Negative     

      for shortness of breath, cough, wheezing, and pleuritic chest pain, Back: Negative for      

      injury and pain, : Negative for injury, bleeding, discharge, and swelling,                

      MS/Extremity: Negative for injury and deformity, Skin: Negative for injury, rash, and       

      discoloration, Neuro: Negative for headache, weakness, numbness, tingling, and seizure,     

      kb                                                                                          

13:20 13:14 Abdomen/GI: Positive for kb                                                       kb  

15:06 14:55 2019 14:55 Discharged to Home. Impression: Diarrhea, unspecified. Condition ca1 

      is Stable. Forms are Medication Reconciliation Form, Thank You Letter, Antibiotic           

      Education, Prescription Opioid Use. Follow up: Private Physician; When: 2 - 3 days;         

      Reason: Recheck today's complaints, Continuance of care, Re-evaluation by your              

      physician. Follow up: Emergency Department; When: As needed; Reason: Worsening of           

      condition. kb                                                                               

                                                                                                  

**************************************************************************************************
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
## 2019-09-01 NOTE — XMS REPORT
Patient Summary Document

 Created on:2019



Patient:SHADE CAMILO

Sex:Female

:1991

External Reference #:[id number]





Demographics







 Address  [address] [place] [address]

 

 Home Phone  [phone]

 

 Work Phone  [phone]

 

 Preferred Language  Unknown

 

 Marital Status  Unknown

 

 Mormon Affiliation  Unknown

 

 Race  Unknown

 

 Additional Race(s)  Unavailable

 

 Ethnic Group  Unknown









Author







 Organization  [facility]

 

 Address  [address] [place] [address] Dr. Pierre [address] [place] [address]

 

 Phone  [phone]









Care Team Providers







 Name  Role  Phone

 

 Unavailable  Unavailable  Unavailable









Problems

This patient has no known problems.



Allergies, Adverse Reactions, Alerts

This patient has no known allergies or adverse reactions.



Medications

This patient has no known medications.

## 2019-09-01 NOTE — ER
Nurse's Notes                                                                                     

 [facility]                                                                 

Name: Alexa Parish                                                                              

Age: 28 yrs                                                                                       

Sex: Female                                                                                       

: 1991                                                                                   

MRN: [id number]                                                                                   

Arrival Date: 2019                                                                          

Time: 13:08                                                                                       

Account#: [id number]                                                                            

Bed 27                                                                                            

Private MD:                                                                                       

Diagnosis: Diarrhea, unspecified                                                                  

                                                                                                  

Presentation:                                                                                     

                                                                                             

13:08 Presenting complaint: Patient states: I have been feeling fatigued for the last few     la1 

      days then started with nausea and diarrhea. Transition of care: patient was not             

      received from another setting of care. Onset of symptoms was 2019. Risk       

      Assessment: Do you want to hurt yourself or someone else? Patient reports no desire to      

      harm self or others. Initial Sepsis Screen: Does the patient meet any 2 criteria? No.       

      Patient's initial sepsis screen is negative. Does the patient have a suspected source       

      of infection? No. Patient's initial sepsis screen is negative. Care prior to arrival:       

      None.                                                                                       

13:08 Method Of Arrival: Ambulatory                                                           la1 

13:08 Acuity: PRANEETH 3                                                                           la1 

                                                                                                  

OB/GYN:                                                                                           

13:09 LMP 8/15/2019                                                                           la1 

                                                                                                  

Historical:                                                                                       

- Allergies:                                                                                      

13:09 Sulfa (Sulfonamide Antibiotics);                                                        la1 

- PMHx:                                                                                           

13:09 Anxiety; Bipolar disorder;                                                              la1 

                                                                                                  

- Immunization history:: Adult Immunizations up to date.                                          

- Social history:: Smoking status: Patient/guardian denies using tobacco.                         

- Ebola Screening: : No symptoms or risks identified at this time.                                

                                                                                                  

                                                                                                  

Screenin:12 Abuse screen: Denies threats or abuse. Denies injuries from another. Nutritional        ca1 

      screening: No deficits noted. Tuberculosis screening: No symptoms or risk factors           

      identified. Fall Risk IV access (20 points).                                                

                                                                                                  

Assessment:                                                                                       

13:12 General: Appears in no apparent distress. comfortable, Behavior is calm, cooperative,   ca1 

      appropriate for age. Pain: Denies pain. Neuro: Level of Consciousness is awake, alert,      

      obeys commands, Oriented to person, place, time, situation, Reports dizziness.              

      Cardiovascular: Heart tones S1 S2 present Capillary refill < 3 seconds Patient's skin       

      is warm and dry. Pulses are all present. Respiratory: Airway is patent Respiratory          

      effort is even, unlabored, Respiratory pattern is regular, symmetrical, Breath sounds       

      are clear bilaterally. GI: Abdomen is round non-distended, Bowel sounds present X 4         

      quads. Abd is soft and non tender X 4 quads. GI: Reports diarrhea, nausea, since 2-3        

      days ago. : Urine is clear. EENT: No deficits noted. No signs and/or symptoms were        

      reported regarding the EENT system. Derm: Skin is intact, is healthy with good turgor,      

      Skin is pink, warm \T\ dry. Musculoskeletal: Circulation, motion, and sensation intact.     

      Capillary refill < 3 seconds, Range of motion: intact in all extremities.                   

14:08 Reassessment: Patient appears in no apparent distress at this time. Patient and/or      ca1 

      family updated on plan of care and expected duration. Pain level reassessed. Patient is     

      alert, oriented x 3, equal unlabored respirations, skin warm/dry/pink.                      

15:04 Reassessment: Patient appears in no apparent distress at this time. Patient and/or      ca1 

      family updated on plan of care and expected duration. Pain level reassessed. Patient is     

      alert, oriented x 3, equal unlabored respirations, skin warm/dry/pink.                      

                                                                                                  

Vital Signs:                                                                                      

13:09  / 78; Pulse 88; Resp 16; Temp 98.4; Pulse Ox 100% on R/A; Weight 140.61 kg;      la1 

      Height 5 ft. 8 in. (172.72 cm);                                                             

13:41  / 67 Supine; Pulse 78;                                                           lt1 

13:41  / 80 Sitting; Pulse 84;                                                          lt1 

13:41  / 86 Standing; Pulse 98;                                                         lt1 

14:08  / 71; Pulse 85; Resp 17 S; Pulse Ox 100% on R/A;                                 ca1 

15:04  / 72; Pulse 79; Resp 14; Temp 98.3(O); Pulse Ox 100% on R/A;                     ca1 

13:09 Body Mass Index 47.13 (140.61 kg, 172.72 cm)                                            la1 

                                                                                                  

ED Course:                                                                                        

13:08 Patient arrived in ED.                                                                  la1 

13:09 Triage completed.                                                                       la1 

13:09 [facility]                                                        kb  

13:09 Vinicio Marsh MD is Attending Physician.                                             kb  

13:10 Arm band placed on right wrist.                                                         la1 

13:12 Patient has correct armband on for positive identification. Placed in gown. Bed in low  ca1 

      position. Call light in reach. Side rails up X 1. Pulse ox on. NIBP on. Warm blanket        

      given.                                                                                      

13:17 Hodan Mosley, RN is Primary Nurse.                                                      ca1 

13:39 Initial lab(s) drawn, by me, sent to lab. Inserted saline lock: 20 gauge in right       lt1 

      antecubital area, using aseptic technique.                                                  

15:05 No provider procedures requiring assistance completed. IV discontinued, intact,         ca1 

      bleeding controlled, No redness/swelling at site. Pressure dressing applied.                

                                                                                                  

Administered Medications:                                                                         

14:17 Drug: NS 0.9% 1000 ml Route: IV; Rate: 1000 ml; Site: right antecubital;                ca1 

                                                                                                  

                                                                                                  

Outcome:                                                                                          

14:55 Discharge ordered by MD.                                                                ron  

15:05 Discharged to home ambulatory, with significant other.                                  ca1 

15:05 Condition: stable                                                                           

15:05 Discharge instructions given to patient, Instructed on discharge instructions, follow       

      up and referral plans. Demonstrated understanding of instructions, follow-up care.          

15:06 Patient left the ED.                                                                    ca1 

                                                                                                  

Signatures:                                                                                       

Trinidad Alberto, MALCOLMP-C                 MALCOLMP-Jasiel Thomson RN                         RN   la1                                                  

Hodan Mosley RN                        RN   ca1                                                  

Leatha Felicianoah                                   lt1                                                  

                                                                                                  

**************************************************************************************************